# Patient Record
Sex: FEMALE | Race: WHITE | Employment: OTHER | URBAN - METROPOLITAN AREA
[De-identification: names, ages, dates, MRNs, and addresses within clinical notes are randomized per-mention and may not be internally consistent; named-entity substitution may affect disease eponyms.]

---

## 2017-10-24 ENCOUNTER — OFFICE VISIT (OUTPATIENT)
Dept: OPHTHALMOLOGY | Facility: CLINIC | Age: 77
End: 2017-10-24
Payer: COMMERCIAL

## 2017-10-24 DIAGNOSIS — H43.813 POSTERIOR VITREOUS DETACHMENT, BILATERAL: ICD-10-CM

## 2017-10-24 DIAGNOSIS — H26.8 PXF (PSEUDOEXFOLIATION OF LENS CAPSULE): ICD-10-CM

## 2017-10-24 DIAGNOSIS — H52.4 PRESBYOPIA: ICD-10-CM

## 2017-10-24 DIAGNOSIS — H25.13 NUCLEAR SCLEROSIS OF BOTH EYES: Primary | ICD-10-CM

## 2017-10-24 PROCEDURE — 92004 COMPRE OPH EXAM NEW PT 1/>: CPT | Performed by: STUDENT IN AN ORGANIZED HEALTH CARE EDUCATION/TRAINING PROGRAM

## 2017-10-24 PROCEDURE — 92015 DETERMINE REFRACTIVE STATE: CPT | Performed by: STUDENT IN AN ORGANIZED HEALTH CARE EDUCATION/TRAINING PROGRAM

## 2017-10-24 RX ORDER — ALBUTEROL SULFATE 90 UG/1
2 AEROSOL, METERED RESPIRATORY (INHALATION)
COMMUNITY
Start: 2015-05-12

## 2017-10-24 RX ORDER — AZITHROMYCIN 250 MG/1
TABLET, FILM COATED ORAL
COMMUNITY
Start: 2017-08-23

## 2017-10-24 RX ORDER — PREDNISONE 20 MG/1
TABLET ORAL
Refills: 0 | COMMUNITY
Start: 2017-08-23

## 2017-10-24 ASSESSMENT — REFRACTION_MANIFEST
OS_ADD: +2.75
OD_CYLINDER: +2.00
OD_SPHERE: PLANO
OS_SPHERE: -2.25
OD_AXIS: 010
OS_CYLINDER: +0.75
OD_ADD: +2.75
OS_AXIS: 010

## 2017-10-24 ASSESSMENT — TONOMETRY
OS_IOP_MMHG: 20
OD_IOP_MMHG: 19
IOP_METHOD: APPLANATION

## 2017-10-24 ASSESSMENT — VISUAL ACUITY
METHOD: SNELLEN - LINEAR
OS_CC: 20/30-2
OD_CC: J2+
OS_CC: J2
OD_CC: 20/25+3
CORRECTION_TYPE: GLASSES

## 2017-10-24 ASSESSMENT — SLIT LAMP EXAM - LIDS
COMMENTS: 2+ DERMATOCHALASIS
COMMENTS: 2+ DERMATOCHALASIS

## 2017-10-24 ASSESSMENT — EXTERNAL EXAM - RIGHT EYE: OD_EXAM: NORMAL

## 2017-10-24 ASSESSMENT — CONF VISUAL FIELD
OS_NORMAL: 1
OD_NORMAL: 1

## 2017-10-24 ASSESSMENT — REFRACTION_WEARINGRX
OD_CYLINDER: +2.00
OS_ADD: +2.75
OD_AXIS: 007
OS_CYLINDER: +1.25
OD_SPHERE: -0.25
OD_ADD: +2.75
OS_SPHERE: -3.00
SPECS_TYPE: PAL
OS_AXIS: 007

## 2017-10-24 ASSESSMENT — EXTERNAL EXAM - LEFT EYE: OS_EXAM: NORMAL

## 2017-10-24 ASSESSMENT — CUP TO DISC RATIO
OD_RATIO: 0.35
OS_RATIO: 0.3

## 2017-10-24 NOTE — PROGRESS NOTES
Current Eye Medications: no     Subjective:  Comprehensive eye exam.   Pt reports that her reading will go out of focus. Pt states vision much better in her right eye. Pt has not had any eye surgeries. Denies any previous eye injuries or problems.     Objective:  See Ophthalmology Exam.      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

## 2017-10-24 NOTE — MR AVS SNAPSHOT
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      Who to contact     If you have questions or need follow up information about today's clinic visit or your schedule please contact [facility] directly at [phone].  Normal or non-critical lab and imaging results will be communicated to you by MyChart, letter or phone within 4 business days after the clinic has received the results. If you do not hear from us within 7 days, please contact the clinic through [employer] or phone. If you have a critical or abnormal lab result, we will notify you by phone as soon as possible.  Submit refill requests through [employer] or call your pharmacy and they will forward the refill request to us. Please allow 3 business days for your refill to be completed.          Additional Information About Your Visit        MyChart Information     [employer] lets you send messages to your doctor, view your test results, renew your prescriptions, schedule appointments and more. To sign up, go to [email or web address] . Click on \"Log in\" on the left side of the screen, which will take you to the Welcome page. Then click on \"Sign up Now\" on the right side of the page.     You will be asked to enter the access code listed below, " as well as some personal information. Please follow the directions to create your username and password.     Your access code is: [email or web address]  Expires: 2018 11:02 AM     Your access code will  in 90 days. If you need help or a new code, please call your [place] clinic or [phone].        Care EveryWhere ID     This is your Care EveryWhere ID. This could be used by other organizations to access your [place] medical records  [id number]         Blood Pressure from Last 3 Encounters:   No data found for BP    Weight from Last 3 Encounters:   No data found for Wt              We Performed the Following     EYE EXAM (SIMPLE-NONBILLABLE)     REFRACTIVE STATUS        Primary Care Provider Fax #    Provider Not In System [phone]                Equal Access to Services     CLAUDIA ALEXANDER : Radha Melo, waaxda luqadaha, qaybta kaalmada adeeliudyabrisa, joy emanuel . So [facility] [phone].    ATENCIÓN: Si habla español, tiene a sylvester disposición servicios gratuitos de asistencia lingüística. Llame al [phone].    We comply with applicable federal civil rights laws and [place] laws. We do not discriminate on the basis of race, color, national origin, age, disability, sex, sexual orientation, or gender identity.            Thank you!     Thank you for choosing [facility] [place]  for your care. Our goal is always to provide you with excellent care. Hearing back from our patients is one way we can continue to improve our services. Please take a few minutes to complete the written survey that you may receive in the mail after your visit with us. Thank you!             Your Updated Medication List - Protect others around you: Learn how to safely use, store and throw away your medicines at www.disposemymeds.org.          This list is accurate as of: 10/24/17 11:02 AM.  Always use your most recent med list.                   Brand Name Dispense Instructions for use  Diagnosis    albuterol 108 (90 BASE) MCG/ACT Inhaler    PROAIR HFA/PROVENTIL HFA/VENTOLIN HFA     Inhale 2 puffs into the lungs        azithromycin 250 MG tablet    ZITHROMAX     Take 500 mg (2 tabs) by mouth on day 1, then 250 mg (1 tab) daily for days 2-5.        predniSONE 20 MG tablet    DELTASONE     TK 2 TS PO DAILY WITH A MEAL FOR 5 DAYS

## 2017-10-24 NOTE — PATIENT INSTRUCTIONS
Glasses prescription given - optional   Continue to monitor for glaucoma suspicion     Heidi Brown MD  [phone]

## 2018-10-25 ENCOUNTER — OFFICE VISIT (OUTPATIENT)
Dept: OPHTHALMOLOGY | Facility: CLINIC | Age: 78
End: 2018-10-25
Payer: COMMERCIAL

## 2018-10-25 DIAGNOSIS — H26.8 PXF (PSEUDOEXFOLIATION OF LENS CAPSULE): ICD-10-CM

## 2018-10-25 DIAGNOSIS — H52.4 PRESBYOPIA: ICD-10-CM

## 2018-10-25 DIAGNOSIS — H43.813 POSTERIOR VITREOUS DETACHMENT, BILATERAL: ICD-10-CM

## 2018-10-25 DIAGNOSIS — H25.13 NUCLEAR SCLEROSIS OF BOTH EYES: Primary | ICD-10-CM

## 2018-10-25 PROCEDURE — 92015 DETERMINE REFRACTIVE STATE: CPT | Performed by: STUDENT IN AN ORGANIZED HEALTH CARE EDUCATION/TRAINING PROGRAM

## 2018-10-25 PROCEDURE — 92014 COMPRE OPH EXAM EST PT 1/>: CPT | Performed by: STUDENT IN AN ORGANIZED HEALTH CARE EDUCATION/TRAINING PROGRAM

## 2018-10-25 ASSESSMENT — VISUAL ACUITY
OD_CC: 20/30
OD_CC+: -1
CORRECTION_TYPE: GLASSES
METHOD: SNELLEN - LINEAR
OS_CC: 20/40

## 2018-10-25 ASSESSMENT — TONOMETRY
OD_IOP_MMHG: 19
IOP_METHOD: APPLANATION
OS_IOP_MMHG: 23

## 2018-10-25 ASSESSMENT — CUP TO DISC RATIO
OD_RATIO: 0.35
OS_RATIO: 0.3

## 2018-10-25 ASSESSMENT — REFRACTION_MANIFEST
OS_CYLINDER: +1.00
OD_ADD: +3.00
OD_SPHERE: -0.25
OD_CYLINDER: +1.75
OS_AXIS: 002
OS_ADD: +3.00
OD_AXIS: 180
OS_SPHERE: -2.50

## 2018-10-25 ASSESSMENT — CONF VISUAL FIELD
OD_NORMAL: 1
OS_NORMAL: 1

## 2018-10-25 ASSESSMENT — SLIT LAMP EXAM - LIDS
COMMENTS: 2+ DERMATOCHALASIS
COMMENTS: 2+ DERMATOCHALASIS

## 2018-10-25 ASSESSMENT — REFRACTION_WEARINGRX
OD_CYLINDER: +2.00
OS_CYLINDER: +1.00
OS_ADD: +2.75
OS_SPHERE: -3.00
OD_ADD: +2.75
SPECS_TYPE: PAL
OS_AXIS: 002
OD_SPHERE: -0.50
OD_AXIS: 008

## 2018-10-25 ASSESSMENT — EXTERNAL EXAM - RIGHT EYE: OD_EXAM: NORMAL

## 2018-10-25 ASSESSMENT — EXTERNAL EXAM - LEFT EYE: OS_EXAM: NORMAL

## 2018-10-25 NOTE — LETTER
"    10/25/2018         RE: Viviane Godfrey  [address] [place]  [place] [address]        Dear Colleague,    Thank you for referring your patient, Viviane Godfrey, to the [facility]. Please see a copy of my visit note below.     Current Eye Medications: none     Subjective:  Here for complete today.  She does not have any eye problems from her perspective, doing fine.  Says it is \"torture\" getting drops here.      Objective:  See Ophthalmology Exam.      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      Posterior vitreous detachment, bilateral        Plan:  Glasses prescription given - optional    Heidi Brown MD  [phone]             Again, thank you for allowing me to participate in the care of your patient.        Sincerely,        Heidi Brown MD    "

## 2018-10-25 NOTE — MR AVS SNAPSHOT
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      Who to contact     If you have questions or need follow up information about today's clinic visit or your schedule please contact [facility] directly at [phone].  Normal or non-critical lab and imaging results will be communicated to you by MyChart, letter or phone within 4 business days after the clinic has received the results. If you do not hear from us within 7 days, please contact the clinic through MyChart or phone. If you have a critical or abnormal lab result, we will notify you by phone as soon as possible.  Submit refill requests through [email or web address] or call your pharmacy and they will forward the refill request to us. Please allow 3 business days for your refill to be completed.          Additional Information About Your Visit        Care EveryWhere ID     This is your Care EveryWhere ID. This could be used by other organizations to access your [place] medical records  [id number]         Blood Pressure from Last 3 Encounters:   No data found for BP    Weight from Last 3 Encounters:   No data found for Wt              We Performed the Following     EYE EXAM (SIMPLE-NONBILLABLE)     REFRACTIVE STATUS        Primary Care Provider Fax #    Physician No Ref-Primary [phone]        No address on file        Equal Access to Services     KALYANICARLOS CATHERINE : Hadii aad ku hadmark Melo, warejida lujosh, qarobert kasachinbrisa royal, waxquyen mehnaz lamb. So [facility] [phone].    ATENCIÓN: Si habla español, tiene a sylvester disposición servicios gratuitos de asistencia lingüística. LlThe [facility] [phone].    We comply with applicable federal civil rights laws and [place] laws. We do not discriminate on the basis of race, color, national origin, age, disability, sex, sexual orientation, or gender identity.            Thank you!     Thank you for choosing [facility] [place]  for your care. Our goal is always to provide you with excellent care. Hearing back from our patients is one way we can continue to improve our services. Please take a few minutes to complete the written survey that you may receive in the mail after your visit with us. Thank you!             Your Updated Medication List - Protect others around you: Learn how to safely use, store and throw away your medicines at www.disposemymeds.org.          This list is accurate as of 10/25/18 11:21 AM.  Always use your most recent med list.                   Brand Name Dispense Instructions for use Diagnosis    albuterol 108 (90 Base) MCG/ACT inhaler    PROAIR HFA/PROVENTIL HFA/VENTOLIN HFA     Inhale 2 puffs into the lungs        azithromycin 250 MG tablet    ZITHROMAX     Take 500 mg (2 tabs) by mouth on day 1, then 250 mg (1 tab) daily for days 2-5.        predniSONE 20 MG tablet    DELTASONE     TK 2 TS PO DAILY WITH A MEAL FOR 5 DAYS

## 2018-10-25 NOTE — PROGRESS NOTES
" Current Eye Medications: none     Subjective:  Here for complete today.  She does not have any eye problems from her perspective, doing fine.  Says it is \"torture\" getting drops here.      Objective:  See Ophthalmology Exam.      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      Posterior vitreous detachment, bilateral        Plan:  Glasses prescription given - optional    Heidi Brown MD  [phone]           "

## 2020-11-04 ENCOUNTER — OFFICE VISIT (OUTPATIENT)
Dept: OPTOMETRY | Facility: CLINIC | Age: 80
End: 2020-11-04
Payer: COMMERCIAL

## 2020-11-04 DIAGNOSIS — H04.123 DRY EYES: ICD-10-CM

## 2020-11-04 DIAGNOSIS — H52.4 PRESBYOPIA: ICD-10-CM

## 2020-11-04 DIAGNOSIS — H52.223 REGULAR ASTIGMATISM OF BOTH EYES: ICD-10-CM

## 2020-11-04 DIAGNOSIS — H50.10 EXOTROPIA: ICD-10-CM

## 2020-11-04 DIAGNOSIS — H52.12 MYOPIA, LEFT: ICD-10-CM

## 2020-11-04 DIAGNOSIS — H26.8 PXF (PSEUDOEXFOLIATION OF LENS CAPSULE): Primary | ICD-10-CM

## 2020-11-04 DIAGNOSIS — H52.01 HYPEROPIA, RIGHT: ICD-10-CM

## 2020-11-04 PROCEDURE — 92004 COMPRE OPH EXAM NEW PT 1/>: CPT | Performed by: OPTOMETRIST

## 2020-11-04 PROCEDURE — 92015 DETERMINE REFRACTIVE STATE: CPT | Mod: GY | Performed by: OPTOMETRIST

## 2020-11-04 RX ORDER — ERGOCALCIFEROL 1.25 MG/1
CAPSULE ORAL
COMMUNITY
Start: 2020-10-06

## 2020-11-04 RX ORDER — ALENDRONATE SODIUM 70 MG/1
70 TABLET ORAL
COMMUNITY
Start: 2020-10-02

## 2020-11-04 ASSESSMENT — KERATOMETRY
OD_K2POWER_DIOPTERS: 44.00
OD_K1POWER_DIOPTERS: 45.00
OD_AXISANGLE2_DEGREES: 173
OS_K1POWER_DIOPTERS: 44.75
OS_K2POWER_DIOPTERS: 44.50
OS_AXISANGLE2_DEGREES: 8

## 2020-11-04 ASSESSMENT — REFRACTION_WEARINGRX
OD_AXIS: 008
OS_ADD: +2.75
OD_SPHERE: -0.50
OD_ADD: +2.75
OD_CYLINDER: +2.00
SPECS_TYPE: PAL
OS_AXIS: 002
OS_CYLINDER: +1.00
OS_SPHERE: -3.00

## 2020-11-04 ASSESSMENT — CONF VISUAL FIELD
METHOD: COUNTING FINGERS
OD_NORMAL: 1
OS_NORMAL: 1

## 2020-11-04 ASSESSMENT — VISUAL ACUITY
OD_CC: 20/40
CORRECTION_TYPE: GLASSES
OD_CC+: -2
OS_PH_CC: 20/30
OS_CC: 20/50
METHOD: SNELLEN - LINEAR
OS_PH_CC+: -3
OS_CC: 20/40
OD_CC: 20/30
OS_CC+: -1

## 2020-11-04 ASSESSMENT — REFRACTION_MANIFEST
OD_SPHERE: +0.25
METHOD_AUTOREFRACTION: 1
OD_AXIS: 173
OS_CYLINDER: +1.00
OD_CYLINDER: +1.75
OS_AXIS: 005
OD_AXIS: 005
OS_CYLINDER: +1.75
OS_SPHERE: -3.00
OD_SPHERE: +0.25
OS_AXIS: 003
OD_CYLINDER: +0.75
OS_SPHERE: -2.00

## 2020-11-04 ASSESSMENT — TONOMETRY
OD_IOP_MMHG: 27
OS_IOP_MMHG: 27
IOP_METHOD: APPLANATION

## 2020-11-04 ASSESSMENT — SLIT LAMP EXAM - LIDS
COMMENTS: DCH- EYELID SKIN RESTING ON EYELASHES
COMMENTS: DCH- EYELID SKIN RESTING ON EYELASHES

## 2020-11-04 ASSESSMENT — EXTERNAL EXAM - LEFT EYE: OS_EXAM: NORMAL

## 2020-11-04 ASSESSMENT — CUP TO DISC RATIO
OS_RATIO: 0.3
OD_RATIO: 0.35

## 2020-11-04 ASSESSMENT — EXTERNAL EXAM - RIGHT EYE: OD_EXAM: NORMAL

## 2020-11-04 NOTE — Clinical Note
11/4/2020         RE: Viviane Godfrey  [address] [place]  [place] [address]        Dear Colleague,    Thank you for referring your patient, Viviane Godfrey, to the [facility]. Please see a copy of my visit note below.    Chief Complaint   Patient presents with     Annual Eye Exam         Last Eye Exam: 10/25/2018  Dilated Previously: Yes    What are you currently using to see?  glasses       Distance Vision Acuity: Satisfied with vision, seems ok     Near Vision Acuity: Satisfied with vision while reading  with glasses, sometimes has to turn her head to get things to come in     Eye Comfort: good and itchy at times , rubs eyes due to itch , has dry mouth at times   Do you use eye drops? : No  Occupation or Hobbies: Retired     [employer] Assistant           Medical, surgical and family histories reviewed and updated 11/4/2020.       OBJECTIVE: See Ophthalmology exam    ASSESSMENT:  No diagnosis found.   PLAN:     There are no Patient Instructions on file for this visit.       Again, thank you for allowing me to participate in the care of your patient.        Sincerely,        Vanita Arzate, OD

## 2020-11-04 NOTE — PROGRESS NOTES
Chief Complaint   Patient presents with     Annual Eye Exam         Last Eye Exam: 10/25/2018  Dilated Previously: Yes    What are you currently using to see?  glasses       Distance Vision Acuity: Satisfied with vision, seems ok     Near Vision Acuity: Satisfied with vision while reading  with glasses, sometimes has to turn her head to get things to come in     Eye Comfort: good and itchy at times , rubs eyes due to itch , has dry mouth at times   Do you use eye drops? : No  Occupation or Hobbies: Retired     [employer] Assistant           Medical, surgical and family histories reviewed and updated 11/4/2020.       OBJECTIVE: See Ophthalmology exam    ASSESSMENT:    ICD-10-CM    1. 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 Regular astigmatism of both eyes  H52.223 EYE EXAM (SIMPLE-NONBILLABLE)     REFRACTION   3. Presbyopia  H52.4 EYE EXAM (SIMPLE-NONBILLABLE)     REFRACTION   4. Hyperopia, right  H52.01 EYE EXAM (SIMPLE-NONBILLABLE)     REFRACTION   5. Myopia, left  H52.12 EYE EXAM (SIMPLE-NONBILLABLE)     REFRACTION   6. Exotropia  H50.10 EYE EXAM (SIMPLE-NONBILLABLE)     REFRACTION   7. Dry eyes  H04.123 EYE EXAM (SIMPLE-NONBILLABLE)     REFRACTION      PLAN:     Patient Instructions   Fill glasses prescription  Allow 2 weeks to adapt to change in glasses  Use over the counter artificial tears 3 times a day (Thera Tears , Systane Ultra or Refresh Optive)  See Dr Anderson for next eye exam in 1 year due to pseudoexfoliation left eye and cataracts       Vanita Arzate, OD  [phone]

## 2020-11-04 NOTE — PATIENT INSTRUCTIONS
Fill glasses prescription  Allow 2 weeks to adapt to change in glasses  Use over the counter artificial tears 3 times a day (Thera Tears , Systane Ultra or Refresh Optive)  See Dr Anderson for next eye exam in 1 year due to pseudoexfoliation left eye and cataracts       Vanita Arzate, OD  [phone]

## 2020-11-08 ASSESSMENT — REFRACTION_MANIFEST
OS_ADD: +2.75
OD_ADD: +2.75

## 2021-02-12 ENCOUNTER — IMMUNIZATION (OUTPATIENT)
Dept: PEDIATRICS | Facility: CLINIC | Age: 81
End: 2021-02-12
Payer: COMMERCIAL

## 2021-02-12 PROCEDURE — 0001A PR COVID VAC PFIZER DIL RECON 30 MCG/0.3 ML IM: CPT

## 2021-02-12 PROCEDURE — 91300 PR COVID VAC PFIZER DIL RECON 30 MCG/0.3 ML IM: CPT

## 2021-03-05 ENCOUNTER — IMMUNIZATION (OUTPATIENT)
Dept: PEDIATRICS | Facility: CLINIC | Age: 81
End: 2021-03-05
Attending: INTERNAL MEDICINE
Payer: COMMERCIAL

## 2021-03-05 PROCEDURE — 91300 PR COVID VAC PFIZER DIL RECON 30 MCG/0.3 ML IM: CPT

## 2021-03-05 PROCEDURE — 0002A PR COVID VAC PFIZER DIL RECON 30 MCG/0.3 ML IM: CPT

## 2021-03-06 ENCOUNTER — HEALTH MAINTENANCE LETTER (OUTPATIENT)
Age: 81
End: 2021-03-06

## 2021-10-09 ENCOUNTER — HEALTH MAINTENANCE LETTER (OUTPATIENT)
Age: 81
End: 2021-10-09

## 2022-03-26 ENCOUNTER — HEALTH MAINTENANCE LETTER (OUTPATIENT)
Age: 82
End: 2022-03-26

## 2022-09-17 ENCOUNTER — HEALTH MAINTENANCE LETTER (OUTPATIENT)
Age: 82
End: 2022-09-17

## 2023-01-01 ENCOUNTER — HEALTH MAINTENANCE LETTER (OUTPATIENT)
Age: 83
End: 2023-01-01